# Patient Record
Sex: MALE | URBAN - METROPOLITAN AREA
[De-identification: names, ages, dates, MRNs, and addresses within clinical notes are randomized per-mention and may not be internally consistent; named-entity substitution may affect disease eponyms.]

---

## 2019-04-15 ENCOUNTER — DOCUMENTATION ONLY (OUTPATIENT)
Dept: SLEEP MEDICINE | Facility: CLINIC | Age: 84
End: 2019-04-15

## 2019-04-15 NOTE — PROGRESS NOTES
JESENIA CAME TO [facility] IN [facility] WANTING TO TRY FF MASK/HEADGEAR DUE TO MOUTH BREATHING.  HE TRIED AIRFIT F20 LARGE CUSHION WITH LARGE HEADGEAR.  HE ALSO TRIED MIRAGE FX WIDE WITH DELUXE CHINSTRAP.  HIS MASK OF CHOICE IS AIRFIT F20 WITH LARGE HEADGEAR.  WENT OVER SUPPLY REPLACEMENT SCHEDULE AND DEMONSTRATED TO CHANGE RUN TIME FOR CARL.